# Patient Record
Sex: MALE | Race: WHITE | NOT HISPANIC OR LATINO | URBAN - METROPOLITAN AREA
[De-identification: names, ages, dates, MRNs, and addresses within clinical notes are randomized per-mention and may not be internally consistent; named-entity substitution may affect disease eponyms.]

---

## 2021-05-27 ENCOUNTER — CLINICAL SUPPORT (OUTPATIENT)
Dept: URGENT CARE | Facility: CLINIC | Age: 66
End: 2021-05-27

## 2021-05-27 DIAGNOSIS — Z23 ENCOUNTER FOR IMMUNIZATION: Primary | ICD-10-CM

## 2021-05-27 PROCEDURE — 90714 TD VACCINE GREATER THAN OR EQUAL TO 7YO WITH PRESERVATIVE IM: ICD-10-PCS | Mod: S$GLB,,, | Performed by: FAMILY MEDICINE

## 2021-05-27 PROCEDURE — 90714 TD VACC NO PRESV 7 YRS+ IM: CPT | Mod: S$GLB,,, | Performed by: FAMILY MEDICINE

## 2021-05-27 PROCEDURE — 90471 TD VACCINE GREATER THAN OR EQUAL TO 7YO WITH PRESERVATIVE IM: ICD-10-PCS | Mod: S$GLB,,, | Performed by: FAMILY MEDICINE

## 2021-05-27 PROCEDURE — 90471 IMMUNIZATION ADMIN: CPT | Mod: S$GLB,,, | Performed by: FAMILY MEDICINE

## 2022-05-25 ENCOUNTER — OFFICE VISIT (OUTPATIENT)
Dept: URGENT CARE | Facility: CLINIC | Age: 67
End: 2022-05-25

## 2022-05-25 VITALS
RESPIRATION RATE: 16 BRPM | OXYGEN SATURATION: 96 % | HEIGHT: 68 IN | BODY MASS INDEX: 21.98 KG/M2 | TEMPERATURE: 98 F | SYSTOLIC BLOOD PRESSURE: 142 MMHG | DIASTOLIC BLOOD PRESSURE: 81 MMHG | HEART RATE: 81 BPM | WEIGHT: 145 LBS

## 2022-05-25 DIAGNOSIS — S61.217A LACERATION OF LEFT LITTLE FINGER WITHOUT FOREIGN BODY WITHOUT DAMAGE TO NAIL, INITIAL ENCOUNTER: Primary | ICD-10-CM

## 2022-05-25 DIAGNOSIS — S61.315A LACERATION OF LEFT RING FINGER WITHOUT FOREIGN BODY WITH DAMAGE TO NAIL, INITIAL ENCOUNTER: ICD-10-CM

## 2022-05-25 PROCEDURE — 99213 PR OFFICE/OUTPT VISIT, EST, LEVL III, 20-29 MIN: ICD-10-PCS | Mod: S$GLB,,, | Performed by: FAMILY MEDICINE

## 2022-05-25 PROCEDURE — 99213 OFFICE O/P EST LOW 20 MIN: CPT | Mod: S$GLB,,, | Performed by: FAMILY MEDICINE

## 2022-05-25 RX ORDER — MUPIROCIN 20 MG/G
OINTMENT TOPICAL
Qty: 22 G | Refills: 1 | Status: SHIPPED | OUTPATIENT
Start: 2022-05-25

## 2022-05-25 NOTE — PROGRESS NOTES
"Subjective:       Patient ID: Gianluca Whitehead is a 67 y.o. male.    Vitals:  height is 5' 8" (1.727 m) and weight is 65.8 kg (145 lb). His temperature is 97.7 °F (36.5 °C). His blood pressure is 142/81 (abnormal) and his pulse is 81. His respiration is 16 and oxygen saturation is 96%.     Chief Complaint: Laceration    Pt states he cut his fingers less than an hour ago on a bandsaw while at home. Pt applied pressure dressing and is utd on tetanus.     Laceration   The incident occurred less than 1 hour ago. The laceration is located on the left hand. The laceration mechanism was a metal edge. The pain is at a severity of 7/10. The pain is moderate. The pain has been constant since onset. He reports no foreign bodies present. His tetanus status is UTD.       Skin: Positive for laceration.       Objective:      Physical Exam   Abdominal: Normal appearance.   Musculoskeletal: Normal range of motion.         General: No swelling. Normal range of motion.   Neurological: He is alert.   Skin: Skin is warm and dry.         Comments: Laceration of 4th and 5th fingers of left hand.   The 4th finger has a laceration of nail and nail bed with avulsion of a large portion of the nail. Bleeding controlled.    The 5th finger has large 4-5 cm laceration in a semicircular pattern from ventral to dorsum laterally. He has good sensation and intact flexion and extension and only mild pain   Nursing note and vitals reviewed.      Laceration Repair    Date/Time: 5/25/2022 11:45 AM  Performed by: Leena Obregon DO  Authorized by: Leena Obregon DO   Consent Done: Emergent Situation  Body area: upper extremity  Laceration length: 5 cm  Foreign bodies: no foreign bodies  Tendon involvement: none  Nerve involvement: none  Vascular damage: no  Anesthesia: digital block    Anesthesia:  Local Anesthetic: lidocaine 2% without epinephrine    Patient sedated: no  Irrigation solution: saline  Irrigation method: syringe  Amount of cleaning: " extensive  Debridement: minimal  Degree of undermining: none  Skin closure: 5-0 nylon  Technique: simple  Approximation: loose  Approximation difficulty: simple  Dressing: antibiotic ointment and non-stick sterile dressing  Comments: The ring finger injury was treated with antibiotic ointment and petrolatum gauze and coban on top        Assessment:       1. Laceration of left little finger without foreign body without damage to nail, initial encounter    2. Laceration of left ring finger without foreign body with damage to nail, initial encounter          Plan:         Laceration of left little finger without foreign body without damage to nail, initial encounter  -     mupirocin (BACTROBAN) 2 % ointment; Apply to affected area 3 times daily  Dispense: 22 g; Refill: 1    Laceration of left ring finger without foreign body with damage to nail, initial encounter  -     mupirocin (BACTROBAN) 2 % ointment; Apply to affected area 3 times daily  Dispense: 22 g; Refill: 1    Other orders  -     Laceration Repair        Pt or guardian provided educational materials and instructions regarding their visit diagnosis.

## 2022-06-06 ENCOUNTER — CLINICAL SUPPORT (OUTPATIENT)
Dept: URGENT CARE | Facility: CLINIC | Age: 67
End: 2022-06-06

## 2022-06-06 VITALS
TEMPERATURE: 98 F | BODY MASS INDEX: 21.98 KG/M2 | OXYGEN SATURATION: 95 % | SYSTOLIC BLOOD PRESSURE: 134 MMHG | WEIGHT: 145 LBS | RESPIRATION RATE: 16 BRPM | DIASTOLIC BLOOD PRESSURE: 75 MMHG | HEIGHT: 68 IN | HEART RATE: 71 BPM

## 2022-06-06 DIAGNOSIS — S61.217A LACERATION OF LEFT LITTLE FINGER WITHOUT FOREIGN BODY WITHOUT DAMAGE TO NAIL, INITIAL ENCOUNTER: Primary | ICD-10-CM

## 2022-06-06 DIAGNOSIS — Z48.02 VISIT FOR SUTURE REMOVAL: ICD-10-CM

## 2022-06-06 PROCEDURE — 99499 UNLISTED E&M SERVICE: CPT | Mod: S$GLB,,, | Performed by: NURSE PRACTITIONER

## 2022-06-06 PROCEDURE — 99499 NO LOS: ICD-10-PCS | Mod: S$GLB,,, | Performed by: NURSE PRACTITIONER

## 2022-06-06 PROCEDURE — 99024 POSTOP FOLLOW-UP VISIT: CPT | Mod: S$GLB,,, | Performed by: NURSE PRACTITIONER

## 2022-06-06 PROCEDURE — 99024 SUTURE REMOVAL: ICD-10-PCS | Mod: S$GLB,,, | Performed by: NURSE PRACTITIONER

## 2022-06-06 NOTE — PROCEDURES
Suture Removal    Date/Time: 6/6/2022 10:00 AM  Location procedure was performed: [facility]  Performed by: Merle Anderson NP  Authorized by: Merle Anderson NP   Pre-operative diagnosis: laceration of left little finger  Body area: upper extremity  Location details: left small finger  Description of findings: well healed scab forming   Wound Appearance: well healed, clean, nontender, nonpurulent and moist  Sutures Removed: 8  Post-removal: no dressing applied  Complications: No  Specimens: No  Implants: No  Patient tolerance: Patient tolerated the procedure well with no immediate complications

## 2022-06-06 NOTE — PROGRESS NOTES
"Subjective:       Patient ID: Gianluca Whitehead is a 67 y.o. male.    Vitals:  height is 5' 8" (1.727 m) and weight is 65.8 kg (145 lb). His temperature is 98.3 °F (36.8 °C). His blood pressure is 134/75 and his pulse is 71. His respiration is 16 and oxygen saturation is 95%.     Chief Complaint: Suture / Staple Removal    Pt is here for suture removal. Seems to have healed well pt is not in any pain nor does he have any complaints.     Suture / Staple Removal  The sutures were placed 7 to 10 days ago.       Constitution: Negative for chills, sweating and fatigue.   Skin: Positive for laceration.       Objective:      Physical Exam   Constitutional: He is oriented to person, place, and time.   HENT:   Head: Normocephalic and atraumatic.   Cardiovascular: Normal rate.   Pulmonary/Chest: Effort normal. No respiratory distress.   Abdominal: Normal appearance.   Neurological: He is alert and oriented to person, place, and time.   Skin: Skin is dry. Abrasions - upper ext.:  Hand (left)      Psychiatric: His behavior is normal. Mood normal.         Assessment:       1. Laceration of left little finger without foreign body without damage to nail, initial encounter    2. Visit for suture removal          Plan:       Suture removals x8 removed.  Wash with soap and water.  Apply Bactroban until healed.      Laceration of left little finger without foreign body without damage to nail, initial encounter  -     Suture Removal    Visit for suture removal  -     Suture Removal                   "